# Patient Record
Sex: MALE | Race: WHITE
[De-identification: names, ages, dates, MRNs, and addresses within clinical notes are randomized per-mention and may not be internally consistent; named-entity substitution may affect disease eponyms.]

---

## 2017-04-24 NOTE — REP
LEFT WRIST, FOUR VIEWS:

 

HISTORY:  Contusion.

 

There is no acute fracture or dislocation. There is an old fracture of the

pisiform. The joint spaces are normal in appearance.

 

IMPRESSION:

 

There is no acute fracture or dislocation.

 

 

Signed by

Jose Francisco Wood MD 04/24/2017 10:01 A

## 2020-02-23 ENCOUNTER — HOSPITAL ENCOUNTER (EMERGENCY)
Dept: HOSPITAL 53 - M ED | Age: 30
Discharge: HOME | End: 2020-02-23
Payer: COMMERCIAL

## 2020-02-23 VITALS — HEIGHT: 70 IN | WEIGHT: 216.71 LBS | BODY MASS INDEX: 31.03 KG/M2

## 2020-02-23 VITALS — DIASTOLIC BLOOD PRESSURE: 77 MMHG | SYSTOLIC BLOOD PRESSURE: 130 MMHG

## 2020-02-23 DIAGNOSIS — Z88.1: ICD-10-CM

## 2020-02-23 DIAGNOSIS — L03.012: Primary | ICD-10-CM

## 2023-01-08 ENCOUNTER — HOSPITAL ENCOUNTER (EMERGENCY)
Dept: HOSPITAL 53 - M ED | Age: 33
Discharge: LEFT BEFORE DISCHARGE | End: 2023-01-08
Payer: COMMERCIAL

## 2023-01-08 VITALS — DIASTOLIC BLOOD PRESSURE: 102 MMHG | SYSTOLIC BLOOD PRESSURE: 203 MMHG

## 2023-01-08 VITALS — WEIGHT: 225.47 LBS | HEIGHT: 70 IN | BODY MASS INDEX: 32.28 KG/M2
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
## 2023-01-08 DIAGNOSIS — S09.90XA: Primary | ICD-10-CM

## 2023-01-08 DIAGNOSIS — Z88.1: ICD-10-CM

## 2023-01-08 DIAGNOSIS — Z53.9: ICD-10-CM

## 2023-01-08 LAB
ALBUMIN SERPL BCG-MCNC: 4.4 G/DL (ref 3.2–5.2)
ALP SERPL-CCNC: 66 U/L (ref 46–116)
ALT SERPL W P-5'-P-CCNC: 124 U/L (ref 7–40)
AMYLASE SERPL-CCNC: 30 U/L (ref 30–118)
APTT BLD: 24.7 SECONDS (ref 24.8–34.2)
AST SERPL-CCNC: 204 U/L (ref ?–34)
BASE EXCESS BLDA CALC-SCNC: -2 MMOL/L (ref -2–2)
BASOPHILS # BLD AUTO: 0 10^3/UL (ref 0–0.2)
BASOPHILS NFR BLD AUTO: 0.4 % (ref 0–1)
BILIRUB CONJ SERPL-MCNC: 0.2 MG/DL (ref ?–0.4)
BILIRUB SERPL-MCNC: 0.5 MG/DL (ref 0.3–1.2)
BUN SERPL-MCNC: 20 MG/DL (ref 9–23)
CALCIUM SERPL-MCNC: 8.7 MG/DL (ref 8.5–10.1)
CHLORIDE SERPL-SCNC: 99 MMOL/L (ref 98–107)
CK MB CFR.DF SERPL CALC: 0.06
CK MB SERPL-MCNC: 4.6 NG/ML (ref ?–3.6)
CK SERPL-CCNC: 7181 U/L (ref 46–171)
CO2 BLDA CALC-SCNC: 27.9 MEQ/L (ref 22–29)
CO2 SERPL-SCNC: 30 MMOL/L (ref 20–31)
CREAT SERPL-MCNC: 1.3 MG/DL (ref 0.7–1.3)
EOSINOPHIL # BLD AUTO: 0.3 10^3/UL (ref 0–0.5)
EOSINOPHIL NFR BLD AUTO: 2.7 % (ref 0–3)
ETHANOL SERPL-MCNC: 0 % (ref 0–0.01)
GFR SERPL CREATININE-BSD FRML MDRD: > 60 ML/MIN/{1.73_M2} (ref 60–?)
GLUCOSE SERPL-MCNC: 204 MG/DL (ref 60–100)
HCO3 BLDA-SCNC: 26.2 MEQ/L (ref 22–26)
HCO3 STD BLDA-SCNC: 22.7 MEQ/L (ref 22–26)
HCT VFR BLD AUTO: 45.8 % (ref 42–52)
HGB BLD-MCNC: 15.1 G/DL (ref 13.5–17.5)
INR PPP: 0.96
LIPASE SERPL-CCNC: 30 U/L (ref 12–53)
LYMPHOCYTES # BLD AUTO: 1.6 10^3/UL (ref 1.5–5)
LYMPHOCYTES NFR BLD AUTO: 17 % (ref 24–44)
MCH RBC QN AUTO: 29.7 PG (ref 27–33)
MCHC RBC AUTO-ENTMCNC: 33 G/DL (ref 32–36.5)
MCV RBC AUTO: 90.2 FL (ref 80–96)
MONOCYTES # BLD AUTO: 1 10^3/UL (ref 0–0.8)
MONOCYTES NFR BLD AUTO: 10.2 % (ref 2–8)
NEUTROPHILS # BLD AUTO: 6.4 10^3/UL (ref 1.5–8.5)
NEUTROPHILS NFR BLD AUTO: 69.4 % (ref 36–66)
PCO2 BLDA: 58 MMHG (ref 35–45)
PH BLDA: 7.27 UNITS (ref 7.35–7.45)
PLATELET # BLD AUTO: 221 10^3/UL (ref 150–450)
PO2 BLDA: 68.7 MMHG (ref 75–100)
POTASSIUM SERPL-SCNC: 3.4 MMOL/L (ref 3.5–5.1)
PROT SERPL-MCNC: 7.5 G/DL (ref 5.7–8.2)
PROTHROMBIN TIME: 13 SECONDS (ref 12.5–14.5)
RBC # BLD AUTO: 5.08 10^6/UL (ref 4.3–6.1)
RSV RNA NPH QL NAA+PROBE: NEGATIVE
SAO2 % BLDA: 92.9 % (ref 95–99)
SODIUM SERPL-SCNC: 139 MMOL/L (ref 136–145)
WBC # BLD AUTO: 9.3 10^3/UL (ref 4–10)

## 2023-07-12 ENCOUNTER — HOSPITAL ENCOUNTER (EMERGENCY)
Dept: HOSPITAL 53 - M ED | Age: 33
Discharge: LEFT BEFORE DISCHARGE | End: 2023-07-12
Payer: COMMERCIAL

## 2023-07-12 VITALS
OXYGEN SATURATION: 98 % | HEIGHT: 70 IN | TEMPERATURE: 97.6 F | SYSTOLIC BLOOD PRESSURE: 126 MMHG | BODY MASS INDEX: 31.56 KG/M2 | DIASTOLIC BLOOD PRESSURE: 79 MMHG | WEIGHT: 220.46 LBS

## 2023-07-12 DIAGNOSIS — Z53.21: Primary | ICD-10-CM

## 2025-07-03 ENCOUNTER — HOSPITAL ENCOUNTER (OUTPATIENT)
Dept: HOSPITAL 53 - M OUTALCOH | Age: 35
End: 2025-07-03
Attending: PSYCHIATRY & NEUROLOGY
Payer: COMMERCIAL

## 2025-07-03 DIAGNOSIS — F11.20: Primary | ICD-10-CM

## 2025-07-03 DIAGNOSIS — F10.10: ICD-10-CM

## 2025-07-03 DIAGNOSIS — F14.10: ICD-10-CM

## 2025-07-10 ENCOUNTER — HOSPITAL ENCOUNTER (OUTPATIENT)
Dept: HOSPITAL 53 - M OUTALCOH | Age: 35
LOS: 21 days | End: 2025-07-31
Attending: PSYCHIATRY & NEUROLOGY
Payer: COMMERCIAL

## 2025-07-10 DIAGNOSIS — F10.10: ICD-10-CM

## 2025-07-10 DIAGNOSIS — F14.10: ICD-10-CM

## 2025-07-10 DIAGNOSIS — F11.20: Primary | ICD-10-CM

## 2025-07-21 ENCOUNTER — HOSPITAL ENCOUNTER (EMERGENCY)
Dept: HOSPITAL 53 - M ED | Age: 35
Discharge: HOME | End: 2025-07-21
Payer: SELF-PAY

## 2025-07-21 VITALS — BODY MASS INDEX: 31.06 KG/M2 | HEIGHT: 70 IN | WEIGHT: 216.93 LBS

## 2025-07-21 VITALS — SYSTOLIC BLOOD PRESSURE: 128 MMHG | DIASTOLIC BLOOD PRESSURE: 78 MMHG | OXYGEN SATURATION: 99 %

## 2025-07-21 VITALS — TEMPERATURE: 98.9 F
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
## 2025-07-21 DIAGNOSIS — Z88.1: ICD-10-CM
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
## 2025-07-21 DIAGNOSIS — S05.01XA: Primary | ICD-10-CM
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
## 2025-07-21 RX ADMIN — ERYTHROMYCIN ONE DOSE: 5 OINTMENT OPHTHALMIC at 07:26

## 2025-07-21 RX ADMIN — IBUPROFEN ONE MG: 600 TABLET, FILM COATED ORAL at 07:34

## 2025-07-21 RX ADMIN — PROPARACAINE HYDROCHLORIDE ONE DROP: 5 SOLUTION/ DROPS OPHTHALMIC at 07:48

## 2025-07-21 RX ADMIN — PROPARACAINE HYDROCHLORIDE ONE DROP: 5 SOLUTION/ DROPS OPHTHALMIC at 06:50

## 2025-07-21 RX ADMIN — FLUORESCEIN SODIUM ONE MG: 1 STRIP OPHTHALMIC at 06:50
